# Patient Record
Sex: FEMALE | Race: WHITE | Employment: FULL TIME | URBAN - NONMETROPOLITAN AREA
[De-identification: names, ages, dates, MRNs, and addresses within clinical notes are randomized per-mention and may not be internally consistent; named-entity substitution may affect disease eponyms.]

---

## 2017-06-24 LAB
ABNORMAL PROTEIN BAND 1: NORMAL
ABNORMAL PROTEIN BAND 2: NORMAL
ABNORMAL PROTEIN BAND 3: NORMAL
ALBUMIN: 100 %
ALBUMIN: 3.9 G/DL (ref 3.5–5)
ALP BLD-CCNC: 103 U/L (ref 45–117)
ALPHA-1-GLOBULIN: 0 %
ALPHA-2-GLOBULIN: 0 %
ALT SERPL-CCNC: 26 U/L (ref 12–78)
AST SERPL-CCNC: 15 U/L (ref 15–37)
BASOPHILS ABSOLUTE: 0.1 10'3/UL (ref 0.1–0.2)
BASOPHILS RELATIVE PERCENT: 0.8 % (ref 0–1.7)
BETA GLOBULIN: 0 %
BILIRUB SERPL-MCNC: 0.5 MG/DL (ref 0–1)
BUN BLDV-MCNC: 33 MG/DL (ref 7–18)
BUN BLDV-MCNC: 34 MG/DL (ref 7–18)
CALCIUM SERPL-MCNC: 8.8 MG/DL (ref 8.5–10.1)
CALCIUM SERPL-MCNC: 8.9 MG/DL (ref 8.5–10.1)
CHLORIDE BLD-SCNC: 111 MMOL/L (ref 97–107)
CHLORIDE BLD-SCNC: 111 MMOL/L (ref 97–107)
CHOLESTEROL/HDL RELATIVE RISK: 2.96
CHOLESTEROL: 139 MG/DL (ref 100–200)
CO2: 24 MMOL/L (ref 21–32)
CO2: 25 MMOL/L (ref 21–32)
CREAT SERPL-MCNC: 1.35 MG/DL (ref 0.55–1.02)
CREAT SERPL-MCNC: 1.35 MG/DL (ref 0.55–1.02)
CREATININE URINE: 122 MG/DL
CREATININE: 120.2 MG/DL (ref 20–320)
EOSINOPHILS ABSOLUTE: 0.1 10'3/UL (ref 0–0.4)
EOSINOPHILS RELATIVE PERCENT: 1.8 % (ref 0–6.4)
GAMMA GLOBULIN: 0 %
GFR CALCULATED: 42
GFR CALCULATED: 42
GLUCOSE: 193 MG/DL (ref 70–99)
GLUCOSE: 197 MG/DL (ref 70–99)
HBA1C MFR BLD: 7.2 % (ref 4.2–6.3)
HCT VFR BLD CALC: 37.2 % (ref 34.6–44.1)
HCT VFR BLD CALC: 37.2 % (ref 34.6–44.1)
HDLC SERPL-MCNC: 47 MG/DL (ref 45–60)
HEMOGLOBIN: 12.7 G/DL (ref 11.7–14.9)
HEMOGLOBIN: 12.7 G/DL (ref 11.7–14.9)
INTERPRETATION: NORMAL
KAPPA/LAMBDA FREE LIGHT CHAIN RATIO: 23.92 (ref 2.04–10.37)
LDL CHOLESTEROL: 49 MG/DL (ref 88–198)
LYMPHOCYTES ABSOLUTE: 2.2 10'3/UL (ref 0.5–3.5)
LYMPHOCYTES RELATIVE PERCENT: 32.8 % (ref 17.4–45.9)
Lab: 150 MG/L (ref 1.35–24.19)
Lab: 6.27 MG/L (ref 0.24–6.66)
MCH RBC QN AUTO: 29.6 PG (ref 27.8–33.2)
MCH RBC QN AUTO: 29.6 PG (ref 27.8–33.2)
MCHC RBC AUTO-ENTMCNC: 34 G/DL (ref 32.7–34.8)
MCHC RBC AUTO-ENTMCNC: 34 G/DL (ref 32.7–34.8)
MCV RBC AUTO: 87 FL (ref 83–97.4)
MCV RBC AUTO: 87 FL (ref 83–97.4)
MICROALBUMIN UR-MCNC: 9.3 MG/L
MICROALBUMIN/CREAT UR-RTO: 8.4 MG/G
MONOCYTES ABSOLUTE: 0.4 10'3/UL (ref 0.2–0.8)
MONOCYTES RELATIVE PERCENT: 6.5 % (ref 4.4–12)
NEUTROPHILS ABSOLUTE: 3.9 10'3/UL (ref 1.5–5.6)
NEUTROPHILS SEGMENTED: 58.1 % (ref 41.2–72.1)
PDW BLD-RTO: 13.8 % (ref 12.2–15.8)
PDW BLD-RTO: 13.8 % (ref 12.2–15.8)
PLATELET # BLD: 206 10'3/UL (ref 122–359)
PLATELET # BLD: 206 10'3/UL (ref 122–359)
PMV BLD AUTO: 7 FL (ref 7.6–10.6)
PMV BLD AUTO: 7 FL (ref 7.6–10.6)
POTASSIUM SERPL-SCNC: 4.6 MMOL/L (ref 3.5–5.1)
POTASSIUM SERPL-SCNC: 4.6 MMOL/L (ref 3.5–5.1)
PROTEIN/CREAT RATIO: 116 MG/G CREAT (ref 21–161)
RBC # BLD: 4.28 10'6/UL (ref 3.85–4.88)
RBC # BLD: 4.28 10'6/UL (ref 3.85–4.88)
SODIUM BLD-SCNC: 143 MMOL/L (ref 136–145)
SODIUM BLD-SCNC: 143 MMOL/L (ref 136–145)
TOTAL PROTEIN: 14 MG/DL (ref 5–24)
TOTAL PROTEIN: 7.5 G/DL (ref 6.4–8.2)
TRIGL SERPL-MCNC: 217 MG/DL
TSH SERPL DL<=0.05 MIU/L-ACNC: 0.79 UIU/ML (ref 0.36–3.74)
VLDLC SERPL CALC-MCNC: 43 MG/DL (ref 5–35)
WBC: 6.7 10'3/UL (ref 3.2–9.3)
WBC: 6.7 10'3/UL (ref 3.2–9.3)

## 2021-01-21 DIAGNOSIS — M25.572 LEFT ANKLE PAIN, UNSPECIFIED CHRONICITY: Primary | ICD-10-CM

## 2021-01-28 ENCOUNTER — HOSPITAL ENCOUNTER (OUTPATIENT)
Dept: GENERAL RADIOLOGY | Age: 66
Discharge: HOME | End: 2021-01-30
Payer: COMMERCIAL

## 2021-01-28 ENCOUNTER — OFFICE VISIT (OUTPATIENT)
Dept: ORTHOPEDIC SURGERY | Age: 66
End: 2021-01-28
Payer: COMMERCIAL

## 2021-01-28 VITALS
DIASTOLIC BLOOD PRESSURE: 72 MMHG | BODY MASS INDEX: 31.65 KG/M2 | SYSTOLIC BLOOD PRESSURE: 144 MMHG | WEIGHT: 172 LBS | HEART RATE: 81 BPM | HEIGHT: 62 IN

## 2021-01-28 DIAGNOSIS — M21.862 GASTROCNEMIUS EQUINUS OF LEFT LOWER EXTREMITY: ICD-10-CM

## 2021-01-28 DIAGNOSIS — M19.072 OSTEOARTHRITIS OF LEFT ANKLE OR FOOT: Primary | ICD-10-CM

## 2021-01-28 DIAGNOSIS — M35.7 FOOT JOINT HYPERMOBILITY: ICD-10-CM

## 2021-01-28 DIAGNOSIS — G62.9 NEUROPATHY: ICD-10-CM

## 2021-01-28 DIAGNOSIS — M77.42 METATARSALGIA OF LEFT FOOT: ICD-10-CM

## 2021-01-28 DIAGNOSIS — M25.572 LEFT ANKLE PAIN, UNSPECIFIED CHRONICITY: ICD-10-CM

## 2021-01-28 PROCEDURE — 4040F PNEUMOC VAC/ADMIN/RCVD: CPT | Performed by: ORTHOPAEDIC SURGERY

## 2021-01-28 PROCEDURE — G8484 FLU IMMUNIZE NO ADMIN: HCPCS | Performed by: ORTHOPAEDIC SURGERY

## 2021-01-28 PROCEDURE — 73610 X-RAY EXAM OF ANKLE: CPT

## 2021-01-28 PROCEDURE — G8400 PT W/DXA NO RESULTS DOC: HCPCS | Performed by: ORTHOPAEDIC SURGERY

## 2021-01-28 PROCEDURE — 1036F TOBACCO NON-USER: CPT | Performed by: ORTHOPAEDIC SURGERY

## 2021-01-28 PROCEDURE — G8427 DOCREV CUR MEDS BY ELIG CLIN: HCPCS | Performed by: ORTHOPAEDIC SURGERY

## 2021-01-28 PROCEDURE — 20605 DRAIN/INJ JOINT/BURSA W/O US: CPT | Performed by: ORTHOPAEDIC SURGERY

## 2021-01-28 PROCEDURE — 1090F PRES/ABSN URINE INCON ASSESS: CPT | Performed by: ORTHOPAEDIC SURGERY

## 2021-01-28 PROCEDURE — 99203 OFFICE O/P NEW LOW 30 MIN: CPT | Performed by: ORTHOPAEDIC SURGERY

## 2021-01-28 PROCEDURE — G8417 CALC BMI ABV UP PARAM F/U: HCPCS | Performed by: ORTHOPAEDIC SURGERY

## 2021-01-28 PROCEDURE — 73630 X-RAY EXAM OF FOOT: CPT

## 2021-01-28 PROCEDURE — 1123F ACP DISCUSS/DSCN MKR DOCD: CPT | Performed by: ORTHOPAEDIC SURGERY

## 2021-01-28 PROCEDURE — 3017F COLORECTAL CA SCREEN DOC REV: CPT | Performed by: ORTHOPAEDIC SURGERY

## 2021-01-28 RX ORDER — ATORVASTATIN CALCIUM 10 MG/1
10 TABLET, FILM COATED ORAL
COMMUNITY

## 2021-01-28 RX ORDER — BIOTIN 5 MG
1 TABLET ORAL DAILY
COMMUNITY

## 2021-01-28 RX ORDER — AMLODIPINE BESYLATE 10 MG/1
TABLET ORAL
COMMUNITY
Start: 2020-12-01

## 2021-01-28 RX ORDER — LIDOCAINE HYDROCHLORIDE 10 MG/ML
2 INJECTION, SOLUTION INFILTRATION; PERINEURAL ONCE
Status: COMPLETED | OUTPATIENT
Start: 2021-01-28 | End: 2021-01-28

## 2021-01-28 RX ORDER — GLIPIZIDE 10 MG/1
TABLET ORAL
COMMUNITY

## 2021-01-28 RX ORDER — TRIAMCINOLONE ACETONIDE 40 MG/ML
40 INJECTION, SUSPENSION INTRA-ARTICULAR; INTRAMUSCULAR ONCE
Status: COMPLETED | OUTPATIENT
Start: 2021-01-28 | End: 2021-01-28

## 2021-01-28 RX ORDER — INSULIN LISPRO 100 [IU]/ML
INJECTION, SUSPENSION SUBCUTANEOUS
COMMUNITY
Start: 2020-12-03

## 2021-01-28 RX ORDER — LIDOCAINE 50 MG/G
PATCH TOPICAL
COMMUNITY
Start: 2020-11-12

## 2021-01-28 RX ORDER — BLOOD SUGAR DIAGNOSTIC
STRIP MISCELLANEOUS
COMMUNITY

## 2021-01-28 RX ORDER — ERGOCALCIFEROL (VITAMIN D2) 1250 MCG
CAPSULE ORAL
COMMUNITY

## 2021-01-28 RX ORDER — INSULIN LISPRO 100 [IU]/ML
10 INJECTION, SOLUTION INTRAVENOUS; SUBCUTANEOUS
COMMUNITY

## 2021-01-28 RX ADMIN — TRIAMCINOLONE ACETONIDE 40 MG: 40 INJECTION, SUSPENSION INTRA-ARTICULAR; INTRAMUSCULAR at 12:27

## 2021-01-28 RX ADMIN — LIDOCAINE HYDROCHLORIDE 2 ML: 10 INJECTION, SOLUTION INFILTRATION; PERINEURAL at 12:28

## 2021-01-28 NOTE — LETTER
Dr. Amor Couch  [address] [place] [address] [phone]        1/28/2021     Patient: Lars Retana  YOB: 1955    Dear ALEKSANDER Horner CNP,    I had the pleasure of seeing one of your patients, Lars Retana recently in the office. Below are the relevant portions of my assessment and plan of care. ASSESSMENT AND PLAN:  She has left plantar forefoot pain greater than anterior ankle joint line pain, likely secondary to multiple processes. Her left plantar forefoot pain may be due to metatarsalgia secondary to her gastroc equinus contracture and medial column hypermobility, but also it is very likely that there is a strong component of neuropathy involved in her pain. At her ankle joint, I am suspicious for osteoarthritis causing her pain. Notably, she has the past medical history as above including osteoporosis, insulin-dependent diabetes, and neuropathy (she reports from a total knee replacement 15 years ago). I had a long discussion today with the patient about the likely diagnosis and its natural history, physical exam and imaging findings, as well as treatment options in detail. We discussed rest/activity modification, swelling control, NSAIDs/Acetaminophen/topical anesthetics, orthotics/shoewear modification, bracing/immobilization, injections, and physical therapy. Orders/referrals were placed as below at today's visit. I recommended physical therapy for calf stretching, as well as an [place] brace, however the patient declines to proceed with either of these options at this time. After discussing the options as above, the patient wished to proceed with a left ankle injection as below. We discussed using this as a diagnostic/therapeutic agent, and she will keep track of how much the injection helps, as well as for how long.

## 2021-01-28 NOTE — PROGRESS NOTES
[address]  [address] [place] [address]  Dept: [phone]  Loc: [phone]    Ambulatory Orthopedic Consult      CHIEF COMPLAINT:    Chief Complaint   Patient presents with    Foot Pain     left foot pain    Ankle Pain     left ankle pain       HISTORY OF PRESENT ILLNESS:      The patient is a 74 y.o. female who is being seen for consultation and evaluation of pain at the left plantar forefoot greater than the left anterior ankle joint, which began in July 2020, secondary to a fall. The pain is described mainly with mechanical terms (dull/sharp/throbbing). The pain is worse with activity and better with rest. The patient reports a static course. The patient has tried:      [x]  rest/activity modification          []  NSAIDs      []  opiates      []  orthotics        []  change in shoes   []  home exercises  [x]  physical therapy      []  CAM boot     []  brace:    [x]  injection:       []  surgery:      She is referred here today by Dr. Starla Romo. She reports that she had 3 injections in August/September 2020 by Dr. Starla Romo, which she reports helped somewhat (she localizes her plantar heel, and the side of her hindfoot as the locations). She also reports a history of pain from her neuropathy (plantar forefoot), which she reports began 15 years ago after her total knee replacement. REVIEW OF SYSTEMS:  Constitutional: Negative for fever. HENT: Negative for tinnitus. Eyes: Negative for pain. Respiratory: Negative for shortness of breath. Cardiovascular: Negative for chest pain. Gastrointestinal: Negative for abdominal pain. Genitourinary: Negative for dysuria. Skin: Negative for rash. Neurological: Negative for headaches. Hematological: Does not bruise/bleed easily.    Musculoskeletal: See HPI for pertinent positives     Past Medical History:    She  has a past medical history of DM (diabetes mellitus) ([facility] [address] and HTN (hypertension). Past Surgical History:    She  has a past surgical history that includes Knee Arthroplasty (Left); Tonsillectomy; Cholecystectomy; and Appendectomy. Current Medications:     Current Outpatient Medications:     blood glucose test strips (EXACTECH TEST) strip, OneTouch Ultra Test strips, Disp: , Rfl:     amLODIPine (NORVASC) 10 MG tablet, TK 1 T PO QD, Disp: , Rfl:     atorvastatin (LIPITOR) 10 MG tablet, Take 10 mg by mouth every morning (before breakfast), Disp: , Rfl:     Biotin 5 MG TABS, Take 1 tablet by mouth daily, Disp: , Rfl:     ergocalciferol (ERGOCALCIFEROL) 1.25 MG (01394 UT) capsule, Vitamin D2 1,250 mcg (50,000 unit) capsule, Disp: , Rfl:     glipiZIDE (GLUCOTROL) 10 MG tablet, glipizide 10 mg tablet, Disp: , Rfl:     insulin lispro, 1 Unit Dial, 100 UNIT/ML SOPN, Inject 10 Units into the skin, Disp: , Rfl:     HUMALOG MIX 75/25 KWIKPEN (75-25) 100 UNIT/ML SUPN injection pen, INJECT 26 UNITS INTO THE SKIN QAM, Disp: , Rfl:     Calcium Carb-Cholecalciferol (CALCIUM 1000 + D PO), Take by mouth daily, Disp: , Rfl:     lidocaine (LIDODERM) 5 %, APPLY 1 PATCH ON THE SKIN QD. REMOVE Q 12 HOURS., Disp: , Rfl:      Allergies:    Darvon [propoxyphene] and Sulfa antibiotics    Family History:  family history is not on file.     Social History:   Social History     Occupational History    Not on file   Tobacco Use    Smoking status: Former Smoker    Smokeless tobacco: Never Used   Substance and Sexual Activity    Alcohol use: Yes     Comment: occ    Drug use: Never    Sexual activity: Not on file     Occupation:  full-time (on her feet)     OBJECTIVE:  BP (!) 144/72   Pulse 81   Ht 5' 2\" (1.575 m)   Wt 172 lb (78 kg)   BMI 31.46 kg/m²    Psych: alert and oriented to person, time, and place  Cardio:  well perfused extremities  Resp:  normal respiratory effort  Skin:  no cyanosis  Hem/lymph:  no lymphedema  Neuro:  sensation to light touch grossly intact throughout all nerve distributions in the foot   Musculoskeletal:    RLE:  Alignment:  Heel neutral   Vascular: Toes warm and well perfused, compartments soft/compressible. No significant swelling of foot. Skin: Intact without rash/lesions/AV malformations. Strength: Able to fire/perform the following with appropriate strength:    [x]  Tib Ant:     [x]  Gastroc-Soleus:         [x]  Inversion:    [x]  Eversion:         [x]  FHL:     [x]  EHL:      Motion:  Normal for the following joints:    [x]  Ankle:     [x]  Subtalar:        [x]  1st MTP:      []  1st TMT:            Tenderness to Palpation:    Tenderness to palpation: None  -no laxity to varus/valgus stress      LLE:  Alignment:  Heel neutral   Vascular: Toes warm and well perfused, compartments soft/compressible. No significant swelling of foot. Skin: Intact without rash/lesions/AV malformations. Strength: Able to fire/perform the following with appropriate strength:    [x]  Tib Ant:     [x]  Gastroc-Soleus:         [x]  Inversion:    [x]  Eversion:         [x]  FHL:     [x]  EHL:      Motion:  Normal for the following joints:    []  Ankle:     [x]  Subtalar:        [x]  1st MTP:      []  1st TMT:            Tenderness to Palpation:    Tenderness to palpation:  anterior ankle joint line greater than plantar forefoot  -Gastroc equinus  -Significant hypermobility of the medial column      RADIOLOGY:   Radiographs obtained and reviewed today, both images and reports as below. No acute fracture, dislocation, radiopaque foreign body/tumor. Degenerative changes at the tibiotalar joint with joint space narrowing and sclerosis. Meary's angle is apex plantar. Large plantar calcaneal spur and significant Achilles enthesopathy. Xr Ankle Left (min 3 Views)    Result Date: 1/28/2021  EXAMINATION: THREE XRAY VIEWS OF THE LEFT ANKLE; THREE XRAY VIEWS OF THE LEFT FOOT 1/28/2021 10:59 am COMPARISON: None.  HISTORY: ORDERING SYSTEM PROVIDED HISTORY: Left ankle pain, unspecified chronicity TECHNOLOGIST PROVIDED HISTORY: WEIGHT BEARING 3 VIEWS:  AP, MORTISE, LATERAL Please include entire foot eval alignment Reason for Exam: Left foot and ankle pain Acuity: Chronic Type of Exam: Initial 57-year-old female with left foot and ankle pain FINDINGS: Left ankle: Moderate edema in the soft tissues of the foot and anterior ankle. No tibiotalar joint effusion. Severe plantar calcaneal spur. Moderate distal Achilles enthesopathy. Os trigonum. Mild degenerative changes of the midfoot and TMT joints. Diffuse osteopenia. No marginal erosions. No acute fracture or gross dislocation. Ankle mortise appears intact. Left foot: Diffuse osteopenia. Mild degenerative changes of the midfoot and TMT joints. Severe plantar calcaneal spur. Moderate distal Achilles enthesopathy. Os trigonum. Moderate edema in the soft tissues of the left foot and anterior ankle. No marginal erosions. No acute fracture or dislocation. No tibiotalar joint effusion. Left ankle: 1. Diffuse osteopenia and degenerative changes as detailed above. Severe plantar calcaneal spur. 2. No acute fracture or dislocation. 3. Moderate edema in the soft tissues of the foot and anterior ankle. Left foot: 1. Diffuse osteopenia and degenerative changes as detailed above. Severe plantar calcaneal spur. 2. No acute fracture or dislocation. 3. Moderate edema in the soft tissues of the left foot and anterior ankle. Xr Foot Left (min 3 Views)    Result Date: 1/28/2021  EXAMINATION: THREE XRAY VIEWS OF THE LEFT ANKLE; THREE XRAY VIEWS OF THE LEFT FOOT 1/28/2021 10:59 am COMPARISON: None.  HISTORY: ORDERING SYSTEM PROVIDED HISTORY: Left ankle pain, unspecified chronicity TECHNOLOGIST PROVIDED HISTORY: WEIGHT BEARING 3 VIEWS:  AP, MORTISE, LATERAL Please include entire foot eval alignment Reason for Exam: Left foot and ankle pain Acuity: Chronic Type of Exam: Initial 57-year-old female with left foot and ankle pain FINDINGS: Left ankle: Moderate edema in the soft tissues of the foot and anterior ankle. No tibiotalar joint effusion. Severe plantar calcaneal spur. Moderate distal Achilles enthesopathy. Os trigonum. Mild degenerative changes of the midfoot and TMT joints. Diffuse osteopenia. No marginal erosions. No acute fracture or gross dislocation. Ankle mortise appears intact. Left foot: Diffuse osteopenia. Mild degenerative changes of the midfoot and TMT joints. Severe plantar calcaneal spur. Moderate distal Achilles enthesopathy. Os trigonum. Moderate edema in the soft tissues of the left foot and anterior ankle. No marginal erosions. No acute fracture or dislocation. No tibiotalar joint effusion. Left ankle: 1. Diffuse osteopenia and degenerative changes as detailed above. Severe plantar calcaneal spur. 2. No acute fracture or dislocation. 3. Moderate edema in the soft tissues of the foot and anterior ankle. Left foot: 1. Diffuse osteopenia and degenerative changes as detailed above. Severe plantar calcaneal spur. 2. No acute fracture or dislocation. 3. Moderate edema in the soft tissues of the left foot and anterior ankle. ASSESSMENT AND PLAN:  Jack Jenkins was seen today for Foot Pain (left foot pain) and Ankle Pain (left ankle pain)  The primary encounter diagnosis was Osteoarthritis of left ankle or foot. Diagnoses of Gastrocnemius equinus of left lower extremity, Metatarsalgia of left foot, Foot joint hypermobility, and Neuropathy were also pertinent to this visit. Body mass index is 31.46 kg/m². She has left plantar forefoot pain greater than anterior ankle joint line pain, likely secondary to multiple processes. Her left plantar forefoot pain may be due to metatarsalgia secondary to her gastroc equinus contracture and medial column hypermobility, but also it is very likely that there is a strong component of neuropathy involved in her pain.   At her ankle joint, I am suspicious for osteoarthritis causing her pain. Notably, she has the past medical history as above including osteoporosis, insulin-dependent diabetes, and neuropathy (she reports from a total knee replacement 15 years ago). I had a long discussion today with the patient about the likely diagnosis and its natural history, physical exam and imaging findings, as well as treatment options in detail. We discussed rest/activity modification, swelling control, NSAIDs/Acetaminophen/topical anesthetics, orthotics/shoewear modification, bracing/immobilization, injections, and physical therapy. Orders/referrals were placed as below at today's visit. I recommended physical therapy for calf stretching, as well as an [place] brace, however the patient declines to proceed with either of these options at this time. After discussing the options as above, the patient wished to proceed with a left ankle injection as below. We discussed using this as a diagnostic/therapeutic agent, and she will keep track of how much the injection helps, as well as for how long. All questions were answered and the patient agrees with the above plan. The patient will return to clinic in 3 months PRN. At her next visit, depending on how she is doing, we may consider a custom orthotic to offload her plantar forefoot. ANKLE INJECTION PROCEDURE NOTE: After discussing the risks/benefits/alternatives to injection, an informed consent was obtained verbally. The left tibiotalar joint was verified as the correct location and allergies were reviewed. The skin overlying the injection site was cleaned with an alcohol swab followed by a local prep with Betadine swabs and allowed to dry. A 25 gauge needle was introduced into the above location via the following approach:  anteromedial. A mixture of 40 mg of Kenalog and 2 mL of 1% Lidocaine without epinephrine was injected.  The patient was noted to tolerate the procedure well without immediate complication. Return in about 3 months (around 4/28/2021), or if symptoms worsen or fail to improve. No orders of the defined types were placed in this encounter. No orders of the defined types were placed in this encounter. Sánchez Fine MD  Orthopedic Surgery, Foot and Ankle        Please excuse any typos/errors, as this note was created with the assistance of voice recognition software. While intending to generate a document that actually reflects the content of the visit, the document can still have some errors including those of syntax and sound-a-like substitutions which may escape proof reading. In such instances, actual meaning can be extrapolated by context.

## 2025-06-25 ENCOUNTER — HOSPITAL ENCOUNTER (OUTPATIENT)
Dept: PHYSICAL THERAPY | Age: 70
Setting detail: THERAPIES SERIES
Discharge: HOME | End: 2025-06-25
Payer: MEDICARE

## 2025-06-25 PROCEDURE — 97750 PHYSICAL PERFORMANCE TEST: CPT | Performed by: PHYSICAL THERAPIST
